# Patient Record
Sex: MALE | URBAN - METROPOLITAN AREA
[De-identification: names, ages, dates, MRNs, and addresses within clinical notes are randomized per-mention and may not be internally consistent; named-entity substitution may affect disease eponyms.]

---

## 2023-07-27 ENCOUNTER — OFFICE VISIT (OUTPATIENT)
Dept: URBAN - METROPOLITAN AREA CLINIC 35 | Facility: CLINIC | Age: 23
End: 2023-07-27
Payer: COMMERCIAL

## 2023-07-27 ENCOUNTER — LAB OUTSIDE AN ENCOUNTER (OUTPATIENT)
Dept: URBAN - METROPOLITAN AREA CLINIC 35 | Facility: CLINIC | Age: 23
End: 2023-07-27

## 2023-07-27 VITALS
WEIGHT: 167 LBS | DIASTOLIC BLOOD PRESSURE: 82 MMHG | BODY MASS INDEX: 22.62 KG/M2 | SYSTOLIC BLOOD PRESSURE: 120 MMHG | HEIGHT: 72 IN

## 2023-07-27 DIAGNOSIS — R63.5 WEIGHT GAIN: ICD-10-CM

## 2023-07-27 DIAGNOSIS — R10.11 RIGHT UPPER QUADRANT ABDOMINAL PAIN: ICD-10-CM

## 2023-07-27 DIAGNOSIS — R19.4 CHANGE IN BOWEL HABITS: ICD-10-CM

## 2023-07-27 DIAGNOSIS — R10.12 LEFT UPPER QUADRANT ABDOMINAL PAIN: ICD-10-CM

## 2023-07-27 PROCEDURE — 99204 OFFICE O/P NEW MOD 45 MIN: CPT | Performed by: PHYSICIAN ASSISTANT

## 2023-07-27 PROCEDURE — 99204 OFFICE O/P NEW MOD 45 MIN: CPT | Performed by: INTERNAL MEDICINE

## 2023-07-27 RX ORDER — SODIUM, POTASSIUM,MAG SULFATES 17.5-3.13G
AS DIRECTED SOLUTION, RECONSTITUTED, ORAL ORAL
Qty: 1 | Refills: 0 | OUTPATIENT
Start: 2023-07-27 | End: 2023-07-29

## 2023-07-27 RX ORDER — DEXTROAMPHETAMINE SACCHARATE, AMPHETAMINE ASPARTATE, DEXTROAMPHETAMINE SULFATE, AND AMPHETAMINE SULFATE 2.5; 2.5; 2.5; 2.5 MG/1; MG/1; MG/1; MG/1
1 TABLET TABLET ORAL TWICE A DAY
Status: ACTIVE | COMMUNITY

## 2023-07-27 NOTE — HPI-ABDOMINAL PAIN
22 year old male patient presents today for abdominal pain that is located on the RUQ. He describes pain as a pressure/aching sensation. Pain onset was about 2-3 months ago, with episodes occurring constantly.  He states even standing still, he will have the pain. Sometimes eating can make it worse.  Denies any recent imaging, labs or procedures for symptoms. Admits he is also having a sharp pain on his rib cage on the left side. He admits this pain is not constant and comes intermittently. He admits an aggravating factor is taking deep breaths.  Denies N/V.

## 2023-07-27 NOTE — HPI-ALTERNATING BOWEL HABITS
Patient admits recent onset of alternating bowel habits. Onset was about 2 months ago with more erratic bowel movements.  Currently admits 1-6 bowel movements per day. Previous bowel movements were about 3-4 bowel movements per day.  Consistency of stools is different and varies.   Denies diarrhea.  Admits occasional strain. Admits occasional mucus and melena in stools. Denies any blood in stools. Admits he is unsure if he has a hemorrhoid or a mass on his anus. Admits some pruritus ani and denies any rectal pain. When patient has a bowel movement he does not feel he is completely emptying his bowels. He admits some fecal urgency. He will have some abd pain in the upper abdomen before a BM, which improves some after a BM.  Hx of IBS, dx at age 17.   Never had a colonoscopy. Grandfather paternal with Crohn's, colitis on his father's side.

## 2023-08-16 ENCOUNTER — CLAIMS CREATED FROM THE CLAIM WINDOW (OUTPATIENT)
Dept: URBAN - METROPOLITAN AREA CLINIC 4 | Facility: CLINIC | Age: 23
End: 2023-08-16
Payer: COMMERCIAL

## 2023-08-16 ENCOUNTER — OFFICE VISIT (OUTPATIENT)
Dept: URBAN - METROPOLITAN AREA SURGERY CENTER 8 | Facility: SURGERY CENTER | Age: 23
End: 2023-08-16
Payer: COMMERCIAL

## 2023-08-16 DIAGNOSIS — D12.5 ADENOMA OF SIGMOID COLON: ICD-10-CM

## 2023-08-16 DIAGNOSIS — K63.89 OTHER SPECIFIED DISEASES OF INTESTINE: ICD-10-CM

## 2023-08-16 DIAGNOSIS — K31.89 OTHER DISEASES OF STOMACH AND DUODENUM: ICD-10-CM

## 2023-08-16 DIAGNOSIS — R19.4 CHANGE IN BOWEL HABITS: ICD-10-CM

## 2023-08-16 DIAGNOSIS — K63.89 APPENDICITIS EPIPLOICA: ICD-10-CM

## 2023-08-16 DIAGNOSIS — D12.5 BENIGN NEOPLASM OF SIGMOID COLON: ICD-10-CM

## 2023-08-16 DIAGNOSIS — K64.0 GRADE I HEMORRHOIDS: ICD-10-CM

## 2023-08-16 DIAGNOSIS — R19.7 ACUTE DIARRHEA: ICD-10-CM

## 2023-08-16 DIAGNOSIS — Z12.11 COLON CANCER SCREENING (HIGH RISK): ICD-10-CM

## 2023-08-16 PROCEDURE — 88305 TISSUE EXAM BY PATHOLOGIST: CPT | Performed by: PATHOLOGY

## 2023-08-16 PROCEDURE — G8907 PT DOC NO EVENTS ON DISCHARG: HCPCS | Performed by: INTERNAL MEDICINE

## 2023-08-16 PROCEDURE — 00811 ANES LWR INTST NDSC NOS: CPT | Performed by: NURSE ANESTHETIST, CERTIFIED REGISTERED

## 2023-08-16 PROCEDURE — 88313 SPECIAL STAINS GROUP 2: CPT | Performed by: PATHOLOGY

## 2023-08-16 PROCEDURE — 45380 COLONOSCOPY AND BIOPSY: CPT | Performed by: INTERNAL MEDICINE

## 2023-08-16 PROCEDURE — 88342 IMHCHEM/IMCYTCHM 1ST ANTB: CPT | Performed by: PATHOLOGY

## 2023-08-31 ENCOUNTER — DASHBOARD ENCOUNTERS (OUTPATIENT)
Age: 23
End: 2023-08-31

## 2023-08-31 ENCOUNTER — OFFICE VISIT (OUTPATIENT)
Dept: URBAN - METROPOLITAN AREA CLINIC 35 | Facility: CLINIC | Age: 23
End: 2023-08-31
Payer: COMMERCIAL

## 2023-08-31 VITALS
BODY MASS INDEX: 22.35 KG/M2 | WEIGHT: 165 LBS | DIASTOLIC BLOOD PRESSURE: 76 MMHG | SYSTOLIC BLOOD PRESSURE: 120 MMHG | HEIGHT: 72 IN

## 2023-08-31 DIAGNOSIS — R10.12 LEFT UPPER QUADRANT ABDOMINAL PAIN: ICD-10-CM

## 2023-08-31 DIAGNOSIS — R63.5 WEIGHT GAIN: ICD-10-CM

## 2023-08-31 DIAGNOSIS — R10.9 ABDOMINAL CRAMPING: ICD-10-CM

## 2023-08-31 DIAGNOSIS — R10.11 RIGHT UPPER QUADRANT ABDOMINAL PAIN: ICD-10-CM

## 2023-08-31 DIAGNOSIS — D12.5 ADENOMATOUS POLYP OF SIGMOID COLON: ICD-10-CM

## 2023-08-31 DIAGNOSIS — K64.8 OTHER HEMORRHOIDS: ICD-10-CM

## 2023-08-31 LAB
ABSOLUTE BASOPHILS: 72
ABSOLUTE EOSINOPHILS: 198
ABSOLUTE LYMPHOCYTES: 1980
ABSOLUTE MONOCYTES: 479
ABSOLUTE NEUTROPHILS: 2772
ALBUMIN/GLOBULIN RATIO: 2
ALBUMIN: 4.6
ALKALINE PHOSPHATASE: 70
ALT: 16
AST: 15
BASOPHILS: 1.3
BILIRUBIN, TOTAL: 0.7
BUN/CREATININE RATIO: (no result)
CALCIUM: 9.7
CARBON DIOXIDE: 29
CHLORIDE: 103
CREATININE: 1.21
EGFR: 86
EOSINOPHILS: 3.6
FIB 4 INDEX: 0.28
FIB 4 INTERPRETATION: (no result)
GLOBULIN: 2.3
GLUCOSE: 90
HEMATOCRIT: 44.5
HEMOGLOBIN: 15.6
LYMPHOCYTES: 36
MCH: 29.3
MCHC: 35.1
MCV: 83.6
MONOCYTES: 8.7
MPV: 10.2
NEUTROPHILS: 50.4
PLATELET COUNT: 309
PLATELET COUNT: 309
POTASSIUM: 4.6
PROTEIN, TOTAL: 6.9
RDW: 12.8
RED BLOOD CELL COUNT: 5.32
SODIUM: 140
TSH: 1.33
UREA NITROGEN (BUN): 15
WHITE BLOOD CELL COUNT: 5.5

## 2023-08-31 PROCEDURE — 99214 OFFICE O/P EST MOD 30 MIN: CPT | Performed by: PHYSICIAN ASSISTANT

## 2023-08-31 RX ORDER — DEXTROAMPHETAMINE SACCHARATE, AMPHETAMINE ASPARTATE, DEXTROAMPHETAMINE SULFATE, AND AMPHETAMINE SULFATE 2.5; 2.5; 2.5; 2.5 MG/1; MG/1; MG/1; MG/1
1 TABLET TABLET ORAL TWICE A DAY
Status: ACTIVE | COMMUNITY

## 2023-08-31 RX ORDER — HYOSCYAMINE SULFATE 0.12 MG/1
1 TABLET AS NEEDED TABLET ORAL
Qty: 30 | Refills: 1 | OUTPATIENT
Start: 2023-08-31 | End: 2023-10-30

## 2023-08-31 NOTE — HPI-ABDOMINAL PAIN
Admits some continued episodes of abdominal pain since his last visit. Admits some improvement but now has some post prandial pains depending on what he eats. Admits since the colonoscopy he does have some sensitivity to foods, mostly unhealthy foods.  He states he does not have heartburn.  He states he will feel cramping in his abdomen when episodes occur.  US abdomen report shows: Pancreatic tail not seen. Otherwise normal abdominal ultrasound.  XR chest report shows no acute disease in the lungs.   Last visit: 22 year old male patient presents today for abdominal pain that is located on the RUQ. He describes pain as a pressure/aching sensation. Pain onset was about 2-3 months ago, with episodes occurring constantly.  He states even standing still, he will have the pain. Sometimes eating can make it worse.  Denies any recent imaging, labs or procedures for symptoms. Admits he is also having a sharp pain on his rib cage on the left side. He admits this pain is not constant and comes intermittently. He admits an aggravating factor is taking deep breaths.  Denies N/V.

## 2023-08-31 NOTE — HPI-ALTERNATING BOWEL HABITS
Currently admits 3-4 bowel movements per day. Stools are formed. He states his bowel habits have improved since last visit and after colonoscopy.  Last visit: Patient admits recent onset of alternating bowel habits. Onset was about 2 months ago with more erratic bowel movements.  Currently admits 1-6 bowel movements per day. Previous bowel movements were about 3-4 bowel movements per day.  Consistency of stools is different and varies.   Denies diarrhea.  Admits occasional strain. Admits occasional mucus and melena in stools. Denies any blood in stools. Admits he is unsure if he has a hemorrhoid or a mass on his anus. Admits some pruritus ani and denies any rectal pain. When patient has a bowel movement he does not feel he is completely emptying his bowels. He admits some fecal urgency. He will have some abd pain in the upper abdomen before a BM, which improves some after a BM.  Hx of IBS, dx at age 17.   Never had a colonoscopy. Grandfather paternal with Crohn's, colitis on his father's side.

## 2023-08-31 NOTE — HPI-COLONOSCOPY FOLLOWUP
22 year old male patient presents today for a follow up from his colonoscopy. He denies any complications after his procedure.   Colonoscopy report shows: - The examined portion of the ileum was normal.   - Normal mucosa in the entire examined colon.  - One 3 mm polyp in the distal sigmoid colon. - Non-bleeding internal hemorrhoids. Terminal Ileum, Biopsy: NO SIGNIFICANT ABNORMALITY. Colon, Right, Biopsy: NO SIGNIFICANT ABNORMALITY. Colon, Left, Biopsy: NO SIGNIFICANT ABNORMALITY. Colon, Sigmoid, distal, Biopsy: TUBULAR ADENOMA(S).

## 2023-10-31 ENCOUNTER — OFFICE VISIT (OUTPATIENT)
Dept: URBAN - METROPOLITAN AREA CLINIC 35 | Facility: CLINIC | Age: 23
End: 2023-10-31